# Patient Record
(demographics unavailable — no encounter records)

---

## 2025-04-11 NOTE — HISTORY OF PRESENT ILLNESS
[[id number]] : 2 weeks ago while at school, had rash, diagnosed with ringworm, given Nystatin, didn't work, now on Lotremin, rash improving.   Several small patches of erythematous scaly. Patient states they're fading.

## 2025-04-11 NOTE — PHYSICAL EXAM
[Normal] : no acute distress, well nourished, well developed and well-appearing [de-identified] : See HPI

## 2025-05-28 NOTE — HISTORY OF PRESENT ILLNESS
[de-identified] : Rash was biopsied by dermatologist at [facility] patient was given a diagnosis of eczema which "sprayed in the blood" as the patches of erythema spread throughout his body.  He  was given oral steroids and clobetasol and rash resolved.  A couple of tiny red spots remain  Patient denies allergies he is otherwise well  Skin now appears normal.  Will check labs